# Patient Record
Sex: MALE | Race: WHITE | NOT HISPANIC OR LATINO | Employment: OTHER | URBAN - METROPOLITAN AREA
[De-identification: names, ages, dates, MRNs, and addresses within clinical notes are randomized per-mention and may not be internally consistent; named-entity substitution may affect disease eponyms.]

---

## 2019-04-19 ENCOUNTER — TRANSFERRED RECORDS (OUTPATIENT)
Dept: HEALTH INFORMATION MANAGEMENT | Facility: CLINIC | Age: 79
End: 2019-04-19

## 2019-11-20 ENCOUNTER — TRANSFERRED RECORDS (OUTPATIENT)
Dept: HEALTH INFORMATION MANAGEMENT | Facility: CLINIC | Age: 79
End: 2019-11-20

## 2021-05-20 ENCOUNTER — TRANSFERRED RECORDS (OUTPATIENT)
Dept: HEALTH INFORMATION MANAGEMENT | Facility: CLINIC | Age: 81
End: 2021-05-20

## 2021-06-16 ENCOUNTER — TRANSFERRED RECORDS (OUTPATIENT)
Dept: HEALTH INFORMATION MANAGEMENT | Facility: CLINIC | Age: 81
End: 2021-06-16

## 2021-06-16 LAB — EJECTION FRACTION: NORMAL %

## 2021-07-30 ENCOUNTER — WALK IN (OUTPATIENT)
Dept: URGENT CARE | Age: 81
End: 2021-07-30

## 2021-07-30 VITALS
RESPIRATION RATE: 18 BRPM | SYSTOLIC BLOOD PRESSURE: 118 MMHG | OXYGEN SATURATION: 96 % | DIASTOLIC BLOOD PRESSURE: 60 MMHG | HEART RATE: 75 BPM | BODY MASS INDEX: 31.94 KG/M2 | WEIGHT: 216.3 LBS | TEMPERATURE: 97.5 F

## 2021-07-30 DIAGNOSIS — L03.032 PARONYCHIA OF GREAT TOE OF LEFT FOOT: Primary | ICD-10-CM

## 2021-07-30 PROCEDURE — 99203 OFFICE O/P NEW LOW 30 MIN: CPT | Performed by: FAMILY MEDICINE

## 2021-07-30 RX ORDER — CEPHALEXIN 500 MG/1
500 CAPSULE ORAL 3 TIMES DAILY
Qty: 30 CAPSULE | Refills: 0 | Status: SHIPPED | OUTPATIENT
Start: 2021-07-30 | End: 2021-08-09

## 2021-10-01 LAB
CREATININE (EXTERNAL): 0.96 MG/DL (ref 0.6–1.5)
GFR ESTIMATED (EXTERNAL): 73 ML/MIN/1.73M2
GFR ESTIMATED (IF AFRICAN AMERICAN) (EXTERNAL): 85 ML/MIN/1.73M2
GLUCOSE (EXTERNAL): 90 MG/DL (ref 70–100)
INR (EXTERNAL): 1 (ref 0–5)
POTASSIUM (EXTERNAL): 4.5 MEQ/L (ref 3.5–5)

## 2021-12-07 ENCOUNTER — TRANSFERRED RECORDS (OUTPATIENT)
Dept: HEALTH INFORMATION MANAGEMENT | Facility: CLINIC | Age: 81
End: 2021-12-07

## 2021-12-07 LAB — ALT SERPL-CCNC: 15 IU/L (ref 0–44)

## 2021-12-20 ENCOUNTER — TRANSFERRED RECORDS (OUTPATIENT)
Dept: HEALTH INFORMATION MANAGEMENT | Facility: CLINIC | Age: 81
End: 2021-12-20

## 2022-05-27 LAB
CHOLESTEROL (EXTERNAL): 177 MG/DL (ref 100–199)
HDLC SERPL-MCNC: 35 MG/DL
LDL CHOLESTEROL CALCULATED (EXTERNAL): 106 MG/DL (ref 0–99)
TRIGLYCERIDES (EXTERNAL): 205 MG/DL (ref 0–149)

## 2022-11-10 ENCOUNTER — TRANSFERRED RECORDS (OUTPATIENT)
Dept: HEALTH INFORMATION MANAGEMENT | Facility: CLINIC | Age: 82
End: 2022-11-10

## 2022-11-10 LAB
ALT SERPL-CCNC: 13 IU/L (ref 0–44)
AST SERPL-CCNC: 14 IU/L (ref 0–40)
CHOLESTEROL (EXTERNAL): 158 MG/DL (ref 100–199)
HDLC SERPL-MCNC: 33 MG/DL
LDL CHOLESTEROL CALCULATED (EXTERNAL): 92 MG/DL (ref 0–99)
TRIGLYCERIDES (EXTERNAL): 194 MG/DL (ref 0–149)

## 2023-01-30 ENCOUNTER — TELEPHONE (OUTPATIENT)
Dept: FAMILY MEDICINE | Facility: CLINIC | Age: 83
End: 2023-01-30

## 2023-01-30 ENCOUNTER — OFFICE VISIT (OUTPATIENT)
Dept: FAMILY MEDICINE | Facility: CLINIC | Age: 83
End: 2023-01-30
Payer: COMMERCIAL

## 2023-01-30 VITALS
WEIGHT: 212 LBS | SYSTOLIC BLOOD PRESSURE: 116 MMHG | RESPIRATION RATE: 14 BRPM | HEIGHT: 69 IN | HEART RATE: 68 BPM | DIASTOLIC BLOOD PRESSURE: 68 MMHG | OXYGEN SATURATION: 96 % | BODY MASS INDEX: 31.4 KG/M2 | TEMPERATURE: 96.4 F

## 2023-01-30 DIAGNOSIS — E78.6 HYPERLIPIDEMIA WITH LOW HDL: ICD-10-CM

## 2023-01-30 DIAGNOSIS — I10 BENIGN ESSENTIAL HYPERTENSION: ICD-10-CM

## 2023-01-30 DIAGNOSIS — C61 PROSTATE CANCER (H): ICD-10-CM

## 2023-01-30 DIAGNOSIS — H40.003 GLAUCOMA SUSPECT, BILATERAL: ICD-10-CM

## 2023-01-30 DIAGNOSIS — I25.10 CORONARY ARTERY DISEASE INVOLVING NATIVE CORONARY ARTERY OF NATIVE HEART WITHOUT ANGINA PECTORIS: ICD-10-CM

## 2023-01-30 DIAGNOSIS — E78.5 HYPERLIPIDEMIA WITH LOW HDL: ICD-10-CM

## 2023-01-30 DIAGNOSIS — Z85.828 HISTORY OF BASAL CELL CARCINOMA: ICD-10-CM

## 2023-01-30 DIAGNOSIS — Z76.89 ENCOUNTER TO ESTABLISH CARE: Primary | ICD-10-CM

## 2023-01-30 PROCEDURE — 99205 OFFICE O/P NEW HI 60 MIN: CPT | Performed by: NURSE PRACTITIONER

## 2023-01-30 RX ORDER — LISINOPRIL 10 MG/1
10 TABLET ORAL DAILY
Qty: 90 TABLET | Refills: 3 | Status: SHIPPED | OUTPATIENT
Start: 2023-01-30

## 2023-01-30 RX ORDER — ATORVASTATIN CALCIUM 80 MG/1
80 TABLET, FILM COATED ORAL DAILY
Qty: 90 TABLET | Refills: 3 | Status: SHIPPED | OUTPATIENT
Start: 2023-01-30

## 2023-01-30 RX ORDER — CLOPIDOGREL BISULFATE 75 MG/1
1 TABLET ORAL DAILY
COMMUNITY
Start: 2022-05-03 | End: 2023-01-30

## 2023-01-30 RX ORDER — CARVEDILOL 6.25 MG/1
6.25 TABLET ORAL 2 TIMES DAILY WITH MEALS
COMMUNITY
End: 2023-01-30

## 2023-01-30 RX ORDER — CARVEDILOL 6.25 MG/1
6.25 TABLET ORAL 2 TIMES DAILY WITH MEALS
Qty: 180 TABLET | Refills: 3 | Status: SHIPPED | OUTPATIENT
Start: 2023-01-30

## 2023-01-30 RX ORDER — ATORVASTATIN CALCIUM 40 MG/1
80 TABLET, FILM COATED ORAL DAILY
COMMUNITY
End: 2023-01-30

## 2023-01-30 RX ORDER — ASPIRIN 81 MG/1
81 TABLET ORAL DAILY
COMMUNITY

## 2023-01-30 RX ORDER — LISINOPRIL 10 MG/1
10 TABLET ORAL DAILY
COMMUNITY
End: 2023-01-30

## 2023-01-30 RX ORDER — CLOPIDOGREL BISULFATE 75 MG/1
75 TABLET ORAL DAILY
Qty: 90 TABLET | Refills: 3 | Status: SHIPPED | OUTPATIENT
Start: 2023-01-30

## 2023-01-30 ASSESSMENT — PAIN SCALES - GENERAL: PAINLEVEL: NO PAIN (0)

## 2023-01-30 NOTE — PROGRESS NOTES
Assessment & Plan     Encounter to establish care  Reviewed medical/social/family history and health maintenance.  Due for MARY in October.  Will continue to reconcile records and HM.  He is UTD on most vaccines, instructed he can send in dates via [employer] and we will update accordingly.     Benign essential hypertension  Well controlled on current regimen.  Refills provided.   - lisinopril (ZESTRIL) 10 MG tablet; Take 1 tablet (10 mg) by mouth daily  - carvedilol (COREG) 6.25 MG tablet; Take 1 tablet (6.25 mg) by mouth 2 times daily (with meals)    Coronary artery disease involving native coronary artery of native heart without angina pectoris  Stents placed 2 years ago.  On dual anti-platelet and statin.  Will refer to cardiology to establish care.  - atorvastatin (LIPITOR) 80 MG tablet; Take 1 tablet (80 mg) by mouth daily  - carvedilol (COREG) 6.25 MG tablet; Take 1 tablet (6.25 mg) by mouth 2 times daily (with meals)  - clopidogrel (PLAVIX) 75 MG tablet; Take 1 tablet (75 mg) by mouth daily  - Adult Cardiology Eval  Referral; Future    Hyperlipidemia with low HDL  Was recommended he go on injectable cholesterol agent for low HDL by previous cardiologist, not covered by insurance.  At his age, not sure if he would benefit.  We discussed maintaining activity levels and following mediterranean diet.  Will defer to cardiology if additional medications are needed to improve his panel.     Glaucoma suspect, bilateral  History of elevated eye pressures, stable overall and not on drops currently.    - Adult Eye  Referral; Future    Prostate cancer (H)  History of, completed radiation therapy.  Will refer to urology for ongoing PSA monitoring which can certainly also be coordinated in our lab.    - Adult Urology  Referral; Future    History of basal cell carcinoma  - Adult Dermatology Referral; Future    Review of external notes as documented elsewhere in note  Ordering of each unique  "test  Prescription drug management  I spent a total of 60 minutes on the day of the visit.   Time spent doing chart review, history and exam, documentation and further activities per the note       BMI:   Estimated body mass index is 31.54 kg/m  as calculated from the following:    Height as of this encounter: 1.746 m (5' 8.75\").    Weight as of this encounter: 96.2 kg (212 lb).           No follow-ups on file.    DAVID Dowell [facility]    Janice Paris is a 82 year old, presenting for the following health issues:  Establish Care      History of Present Illness       Reason for visit:  Estblishing Care    He eats 0-1 servings of fruits and vegetables daily.He consumes 0 sweetened beverage(s) daily.He exercises with enough effort to increase his heart rate 30 to 60 minutes per day.  He exercises with enough effort to increase his heart rate 3 or less days per week.   He is taking medications regularly.     Originally from [place], moved to [place] for 5 years, then in [place] in 13years.  Wife passed away in Dec 2021.  Kids and grandkids were in [place], but are all moving back to [place].    Now living in [facility] across the [address], moved in just a week ago to the [facility].     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  Plays pickleball.  electric bike.    Followed with a cardiologist, Dermatologist, ophthalmologist, urologist.     PMH:  Had stents x 2 placed 2 years ago.    Dermatology for routine skin checks, basal cell carcinoma.    Urology:  History of prostate cancer 3 years ago.  Did radiation treatment, narrow beam radiation.   Has been testing PSA every 6-8 months.  PSA never hit zero, but has been dropping.    Opthalmology: 25 years ago had increased eye pressures.  Was told he had glaucoma.  Was on eye drops for a long time, when he moved to [place]- was told he could " "stop.  Hasn't been on drops for a long time, but eye pressure is still mildly elevated.  Also had cataract surgery about 5 years ago.      Last HDL (12/8/2022) was 35, lDL was 31, Total cholesteral 173, Triglycerides 213.  Was told by his cardiologist he should be on an injectable cholesterol medication, was not covered by his insurance.      Review of Systems   Constitutional, HEENT, cardiovascular, pulmonary, gi and gu systems are negative, except as otherwise noted.      Objective    /68   Pulse 68   Temp (!) 96.4  F (35.8  C) (Tympanic)   Resp 14   Ht 1.746 m (5' 8.75\")   Wt 96.2 kg (212 lb)   SpO2 96%   BMI 31.54 kg/m    Body mass index is 31.54 kg/m .  Physical Exam   GENERAL: healthy, alert and no distress  EYES: Eyes grossly normal to inspection, PERRL and conjunctivae and sclerae normal  MS: no gross musculoskeletal defects noted, no edema  NEURO: Normal strength and tone, mentation intact and speech normal  PSYCH: mentation appears normal, affect normal/bright                    "

## 2023-02-09 ENCOUNTER — OFFICE VISIT (OUTPATIENT)
Dept: CARDIOLOGY | Facility: CLINIC | Age: 83
End: 2023-02-09
Attending: NURSE PRACTITIONER
Payer: COMMERCIAL

## 2023-02-09 VITALS
OXYGEN SATURATION: 94 % | BODY MASS INDEX: 31.39 KG/M2 | SYSTOLIC BLOOD PRESSURE: 105 MMHG | DIASTOLIC BLOOD PRESSURE: 67 MMHG | HEART RATE: 63 BPM | RESPIRATION RATE: 18 BRPM | WEIGHT: 211 LBS

## 2023-02-09 DIAGNOSIS — I25.10 CORONARY ARTERY DISEASE INVOLVING NATIVE CORONARY ARTERY OF NATIVE HEART WITHOUT ANGINA PECTORIS: ICD-10-CM

## 2023-02-09 LAB
ANION GAP SERPL CALCULATED.3IONS-SCNC: 10 MMOL/L (ref 7–15)
ATRIAL RATE - MUSE: 55 BPM
BASOPHILS # BLD AUTO: 0.1 10E3/UL (ref 0–0.2)
BASOPHILS NFR BLD AUTO: 1 %
BUN SERPL-MCNC: 24.6 MG/DL (ref 8–23)
CALCIUM SERPL-MCNC: 9.6 MG/DL (ref 8.8–10.2)
CHLORIDE SERPL-SCNC: 106 MMOL/L (ref 98–107)
CHOLEST SERPL-MCNC: 159 MG/DL
CREAT SERPL-MCNC: 0.95 MG/DL (ref 0.67–1.17)
DEPRECATED HCO3 PLAS-SCNC: 25 MMOL/L (ref 22–29)
DIASTOLIC BLOOD PRESSURE - MUSE: NORMAL MMHG
EOSINOPHIL # BLD AUTO: 0.2 10E3/UL (ref 0–0.7)
EOSINOPHIL NFR BLD AUTO: 2 %
ERYTHROCYTE [DISTWIDTH] IN BLOOD BY AUTOMATED COUNT: 13.7 % (ref 10–15)
GFR SERPL CREATININE-BSD FRML MDRD: 79 ML/MIN/1.73M2
GLUCOSE SERPL-MCNC: 91 MG/DL (ref 70–99)
HCT VFR BLD AUTO: 42.8 % (ref 40–53)
HDLC SERPL-MCNC: 37 MG/DL
HGB BLD-MCNC: 14.4 G/DL (ref 13.3–17.7)
IMM GRANULOCYTES # BLD: 0 10E3/UL
IMM GRANULOCYTES NFR BLD: 1 %
INTERPRETATION ECG - MUSE: NORMAL
LDLC SERPL CALC-MCNC: 88 MG/DL
LYMPHOCYTES # BLD AUTO: 1.8 10E3/UL (ref 0.8–5.3)
LYMPHOCYTES NFR BLD AUTO: 21 %
MCH RBC QN AUTO: 31.2 PG (ref 26.5–33)
MCHC RBC AUTO-ENTMCNC: 33.6 G/DL (ref 31.5–36.5)
MCV RBC AUTO: 93 FL (ref 78–100)
MONOCYTES # BLD AUTO: 0.9 10E3/UL (ref 0–1.3)
MONOCYTES NFR BLD AUTO: 11 %
NEUTROPHILS # BLD AUTO: 5.6 10E3/UL (ref 1.6–8.3)
NEUTROPHILS NFR BLD AUTO: 64 %
NONHDLC SERPL-MCNC: 122 MG/DL
NRBC # BLD AUTO: 0 10E3/UL
NRBC BLD AUTO-RTO: 0 /100
P AXIS - MUSE: 47 DEGREES
PLATELET # BLD AUTO: 206 10E3/UL (ref 150–450)
POTASSIUM SERPL-SCNC: 4.4 MMOL/L (ref 3.4–5.3)
PR INTERVAL - MUSE: 162 MS
QRS DURATION - MUSE: 86 MS
QT - MUSE: 428 MS
QTC - MUSE: 409 MS
R AXIS - MUSE: -24 DEGREES
RBC # BLD AUTO: 4.62 10E6/UL (ref 4.4–5.9)
SODIUM SERPL-SCNC: 141 MMOL/L (ref 136–145)
SYSTOLIC BLOOD PRESSURE - MUSE: NORMAL MMHG
T AXIS - MUSE: 39 DEGREES
TRIGL SERPL-MCNC: 169 MG/DL
VENTRICULAR RATE- MUSE: 55 BPM
WBC # BLD AUTO: 8.5 10E3/UL (ref 4–11)

## 2023-02-09 PROCEDURE — 36415 COLL VENOUS BLD VENIPUNCTURE: CPT | Performed by: INTERNAL MEDICINE

## 2023-02-09 PROCEDURE — 99205 OFFICE O/P NEW HI 60 MIN: CPT | Mod: 25 | Performed by: INTERNAL MEDICINE

## 2023-02-09 PROCEDURE — 80061 LIPID PANEL: CPT | Performed by: INTERNAL MEDICINE

## 2023-02-09 PROCEDURE — 85025 COMPLETE CBC W/AUTO DIFF WBC: CPT | Performed by: INTERNAL MEDICINE

## 2023-02-09 PROCEDURE — 80048 BASIC METABOLIC PNL TOTAL CA: CPT | Performed by: INTERNAL MEDICINE

## 2023-02-09 PROCEDURE — 93000 ELECTROCARDIOGRAM COMPLETE: CPT | Performed by: INTERNAL MEDICINE

## 2023-02-09 NOTE — PROGRESS NOTES
HEART CARE ENCOUNTER CONSULTATON NOTE      [facility]  [phone]      Assessment/Recommendations   Assessment/Plan:  CAD - will get records from [place]. On aspirin and clopidogrel, which I suspect we can stop the clopidogrel. On high intensity statin. Diet and exercise discussed. EKG today sinus ester with a PAC.    Hyperlipidemia -  a year ago. Repeat today and will likely add zetia.    Hypertension - controlled, decreasing lisinopril as below. Check BP daily at noon for two weeks and let us know the numbers.     Orthostasis - decrease lisinopril and hydration encouraged.     F/U 12 months       History of Present Illness/Subjective    HPI: Shilo Carver is a 82 year old male with CAD, hypertension and hyperlipidemia who has moved from [place] since his wife's recent death. No [place] records are currently availble. He estimates that in 2020 he saw his PCP for hypertension. He was then sent to a cardiologist (Dr. Neo Pinzon in [place], [place]) and had what sounds like a coronary CTA. He then underwent stenting with three Xience DENISHA (3.0 x 23, 2.5 x 12, and 3.5 x38) to two different arteries he thinks. Wellington says he had absolutely no symptoms at that time and did not feel any differently after. Wellington is active, typically biking and playing pickle ball without trouble. He has not started exercising again since moving her this winter. Wellington denies chest pain, pnd/orthopnea, dyspnea, edema, syncope and palpitations. He has some brief orthostasis. His BP was high during his move and his lisinopril was increased to 5mg daily. Over the past month or so he has noted the orthostasis and that his BP has been on the lower side. Wellington is having a trouble with food as he never had to cook before and is eating frozen foods.          Physical Examination  Review of Systems   Vitals: /67 (BP Location: Right arm, Patient Position: Sitting, Cuff Size: Adult Large)   Pulse 63   Resp 18   Wt 95.7 kg  (211 lb)   SpO2 94%   BMI 31.39 kg/m    BMI= Body mass index is 31.39 kg/m .  Wt Readings from Last 3 Encounters:   23 95.7 kg (211 lb)   23 96.2 kg (212 lb)       General Appearance:   no distress, overweight body habitus   ENT/Mouth: membranes moist, no oral lesions or bleeding gums.      EYES:  no scleral icterus, normal conjunctivae   Neck: no carotid bruits or thyromegaly   Chest/Lungs:   lungs are clear to auscultation, no rales or wheezing, no sternal scar, equal chest wall expansion    Cardiovascular:   Regular. Normal first and second heart sounds with no murmur. No rubs or gallops; the right carotid, radial and posterior tibial pulses are intact and the left carotid, radial and posterior tibial pulses are intact.  Jugular venous pressure is flat, no edema bilaterally    Abdomen:  no organomegaly, masses, bruits, or tenderness; bowel sounds are present   Extremities: no cyanosis or clubbing   Skin: no xanthelasma, warm.    Neurologic: normal  bilateral, no tremors     Psychiatric: alert and oriented x3, calm        Please refer above for cardiac ROS details.        Medical History  Surgical History Family History Social History   Past Medical History:   Diagnosis Date     Cancer (H)      Coronary artery disease      Hyperlipidemia      Hypertension      Past Surgical History:   Procedure Laterality Date     CORONARY ANGIOGRAPHY ADULT ORDER       HEART CATH, ANGIOPLASTY       Family History   Family history unknown: Yes        Social History     Socioeconomic History     Marital status:      Spouse name: Not on file     Number of children: Not on file     Years of education: Not on file     Highest education level: Not on file   Occupational History     Not on file   Tobacco Use     Smoking status: Former     Types: Cigarettes     Start date:      Quit date:      Years since quittin.1     Smokeless tobacco: Never   Substance and Sexual Activity     Alcohol use: Yes      Comment: 2/night     Drug use: Never     Sexual activity: Not on file   Other Topics Concern     Not on file   Social History Narrative     Not on file     Social Determinants of Health     Financial Resource Strain: Not on file   Food Insecurity: Not on file   Transportation Needs: Not on file   Physical Activity: Not on file   Stress: Not on file   Social Connections: Not on file   Intimate Partner Violence: Not on file   Housing Stability: Not on file           Medications  Allergies   Current Outpatient Medications   Medication Sig Dispense Refill     aspirin 81 MG EC tablet Take 81 mg by mouth daily       atorvastatin (LIPITOR) 80 MG tablet Take 1 tablet (80 mg) by mouth daily 90 tablet 3     carvedilol (COREG) 6.25 MG tablet Take 1 tablet (6.25 mg) by mouth 2 times daily (with meals) 180 tablet 3     clopidogrel (PLAVIX) 75 MG tablet Take 1 tablet (75 mg) by mouth daily 90 tablet 3     lisinopril (ZESTRIL) 10 MG tablet Take 1 tablet (10 mg) by mouth daily 90 tablet 3     No Known Allergies       Lab Results    Chemistry/lipid CBC Cardiac Enzymes/BNP/TSH/INR   No results for input(s): CHOL, HDL, LDL, TRIG, CHOLHDLRATIO in the last 30857 hours.  No results for input(s): LDL in the last 87439 hours.  No results for input(s): NA, POTASSIUM, CHLORIDE, CO2, GLC, BUN, CR, GFRESTIMATED, JUAN in the last 32201 hours.    Invalid input(s): GRFESTBLACK  No results for input(s): CR in the last 64062 hours.  No results for input(s): A1C in the last 65806 hours.       No results for input(s): WBC, HGB, HCT, MCV, PLT in the last 52695 hours.  No results for input(s): HGB in the last 31361 hours. No results for input(s): TROPONINI in the last 24044 hours.  No results for input(s): BNP, NTBNPI, NTBNP in the last 38353 hours.  No results for input(s): TSH in the last 33552 hours.  No results for input(s): INR in the last 17863 hours.     Today's clinic visit entailed:  Review of prior external note(s) from - [facility]where  information from epic reviewed  60 minutes spent on the date of the encounter doing chart review, review of outside records, review of test results, interpretation of tests, patient visit and documentation   Provider  Link to [facility] Help Grid     The level of medical decision making during this visit was of high complexity.        Lindsey Kumar MD

## 2023-02-09 NOTE — LETTER
2/9/2023    Shauna Feliz, APRN CNP  [address]  [place] [address]    RE: Shilo Carver       Dear Colleague,     I had the [facility] Shilo [facility].    HEART CARE ENCOUNTER CONSULTATON NOTE      [facility]  [phone]      Assessment/Recommendations   Assessment/Plan:  CAD - will get records from [place]. On aspirin and clopidogrel, which I suspect we can stop the clopidogrel. On high intensity statin. Diet and exercise discussed. EKG today sinus ester with a PAC.    Hyperlipidemia -  a year ago. Repeat today and will likely add zetia.    Hypertension - controlled, decreasing lisinopril as below. Check BP daily at noon for two weeks and let us know the numbers.     Orthostasis - decrease lisinopril and hydration encouraged.     F/U 12 months       History of Present Illness/Subjective    HPI: Shilo Carver is a 82 year old male with CAD, hypertension and hyperlipidemia who has moved from [place] since his wife's recent death. No [place] records are currently availble. He estimates that in 2020 he saw his PCP for hypertension. He was then sent to a cardiologist (Dr. Neo Pinzon in [place], [place]) and had what sounds like a coronary CTA. He then underwent stenting with three Xience DENISHA (3.0 x 23, 2.5 x 12, and 3.5 x38) to two different arteries he thinks. Wellington says he had absolutely no symptoms at that time and did not feel any differently after. Wellington is active, typically biking and playing pickle ball without trouble. He has not started exercising again since moving her this winter. Wellington denies chest pain, pnd/orthopnea, dyspnea, edema, syncope and palpitations. He has some brief orthostasis. His BP was high during his move and his lisinopril was increased to 5mg daily. Over the past month or so he has noted the orthostasis and that his BP has been on the lower side. Wellington is having a trouble with food as he never had to cook before and is  eating frozen foods.          Physical Examination  Review of Systems   Vitals: /67 (BP Location: Right arm, Patient Position: Sitting, Cuff Size: Adult Large)   Pulse 63   Resp 18   Wt 95.7 kg (211 lb)   SpO2 94%   BMI 31.39 kg/m    BMI= Body mass index is 31.39 kg/m .  Wt Readings from Last 3 Encounters:   02/09/23 95.7 kg (211 lb)   01/30/23 96.2 kg (212 lb)       General Appearance:   no distress, overweight body habitus   ENT/Mouth: membranes moist, no oral lesions or bleeding gums.      EYES:  no scleral icterus, normal conjunctivae   Neck: no carotid bruits or thyromegaly   Chest/Lungs:   lungs are clear to auscultation, no rales or wheezing, no sternal scar, equal chest wall expansion    Cardiovascular:   Regular. Normal first and second heart sounds with no murmur. No rubs or gallops; the right carotid, radial and posterior tibial pulses are intact and the left carotid, radial and posterior tibial pulses are intact.  Jugular venous pressure is flat, no edema bilaterally    Abdomen:  no organomegaly, masses, bruits, or tenderness; bowel sounds are present   Extremities: no cyanosis or clubbing   Skin: no xanthelasma, warm.    Neurologic: normal  bilateral, no tremors     Psychiatric: alert and oriented x3, calm        Please refer above for cardiac ROS details.        Medical History  Surgical History Family History Social History   Past Medical History:   Diagnosis Date     Cancer (H)      Coronary artery disease      Hyperlipidemia      Hypertension      Past Surgical History:   Procedure Laterality Date     CORONARY ANGIOGRAPHY ADULT ORDER       HEART CATH, ANGIOPLASTY       Family History   Family history unknown: Yes        Social History     Socioeconomic History     Marital status:      Spouse name: Not on file     Number of children: Not on file     Years of education: Not on file     Highest education level: Not on file   Occupational History     Not on file   Tobacco Use      Smoking status: Former     Types: Cigarettes     Start date:      Quit date: 1980     Years since quittin.1     Smokeless tobacco: Never   Substance and Sexual Activity     Alcohol use: Yes     Comment: 2/night     Drug use: Never     Sexual activity: Not on file   Other Topics Concern     Not on file   Social History Narrative     Not on file     Social Determinants of Health     Financial Resource Strain: Not on file   Food Insecurity: Not on file   Transportation Needs: Not on file   Physical Activity: Not on file   Stress: Not on file   Social Connections: Not on file   Intimate Partner Violence: Not on file   Housing Stability: Not on file           Medications  Allergies   Current Outpatient Medications   Medication Sig Dispense Refill     aspirin 81 MG EC tablet Take 81 mg by mouth daily       atorvastatin (LIPITOR) 80 MG tablet Take 1 tablet (80 mg) by mouth daily 90 tablet 3     carvedilol (COREG) 6.25 MG tablet Take 1 tablet (6.25 mg) by mouth 2 times daily (with meals) 180 tablet 3     clopidogrel (PLAVIX) 75 MG tablet Take 1 tablet (75 mg) by mouth daily 90 tablet 3     lisinopril (ZESTRIL) 10 MG tablet Take 1 tablet (10 mg) by mouth daily 90 tablet 3     No Known Allergies       Lab Results    Chemistry/lipid CBC Cardiac Enzymes/BNP/TSH/INR   No results for input(s): CHOL, HDL, LDL, TRIG, CHOLHDLRATIO in the last 70114 hours.  No results for input(s): LDL in the last 25336 hours.  No results for input(s): NA, POTASSIUM, CHLORIDE, CO2, GLC, BUN, CR, GFRESTIMATED, JUAN in the last 61135 hours.    Invalid input(s): GRFESTBLACK  No results for input(s): CR in the last 28920 hours.  No results for input(s): A1C in the last 24924 hours.       No results for input(s): WBC, HGB, HCT, MCV, PLT in the last 21718 hours.  No results for input(s): HGB in the last 13109 hours. No results for input(s): TROPONINI in the last 41625 hours.  No results for input(s): BNP, NTBNPI, NTBNP in the last 18313 hours.  No  results for input(s): TSH in the last 37748 hours.  No results for input(s): INR in the last 73342 hours.     Today's clinic visit entailed:  Review of prior external note(s) from - [facility] information from epic reviewed  60 minutes spent on the date of the encounter doing chart review, review of outside records, review of test results, interpretation of tests, patient visit and documentation   Provider  Link to [facility] Help Grid     The level of medical decision making during this visit was of high complexity.        Lindsey Kumar MD              Thank you for allowing me to participate in the care of your patient.      Sincerely,     Lindsey Kumar MD     [facility]  cc:   Shauna Feliz, APRN CNP  [address] [place] [address]

## 2023-02-12 ENCOUNTER — HEALTH MAINTENANCE LETTER (OUTPATIENT)
Age: 83
End: 2023-02-12

## 2023-02-15 ENCOUNTER — TELEPHONE (OUTPATIENT)
Dept: CARDIOLOGY | Facility: CLINIC | Age: 83
End: 2023-02-15
Payer: COMMERCIAL

## 2023-02-15 DIAGNOSIS — I25.10 CORONARY ARTERY DISEASE INVOLVING NATIVE CORONARY ARTERY OF NATIVE HEART WITHOUT ANGINA PECTORIS: Primary | ICD-10-CM

## 2023-02-15 RX ORDER — EZETIMIBE 10 MG/1
10 TABLET ORAL DAILY
Qty: 30 TABLET | Refills: 11 | Status: SHIPPED | OUTPATIENT
Start: 2023-02-15 | End: 2023-02-15

## 2023-02-15 RX ORDER — EZETIMIBE 10 MG/1
10 TABLET ORAL DAILY
Qty: 90 TABLET | Refills: 3 | Status: SHIPPED | OUTPATIENT
Start: 2023-02-15

## 2023-02-15 NOTE — TELEPHONE ENCOUNTER
PC with patient and review of recent blood work.   Andre Cannon RN   2/15/2023  4:19 PM CST Back to Top      PC with patient and review. Pt verbalized understanding. Agreeable to start zetia. Rx sent to verified pharmacy location. Lab order placed. Encouraged patient to obtain in 3 months. See telephone encounter and discussion of BP readings. Naomi VELA    He will obtain his fasting lipid panel at his PCP office. No further questions. MIRIANRn

## 2024-03-10 ENCOUNTER — HEALTH MAINTENANCE LETTER (OUTPATIENT)
Age: 84
End: 2024-03-10

## 2024-05-07 DIAGNOSIS — I25.10 CORONARY ARTERY DISEASE INVOLVING NATIVE CORONARY ARTERY OF NATIVE HEART WITHOUT ANGINA PECTORIS: Primary | ICD-10-CM

## 2025-03-16 ENCOUNTER — HEALTH MAINTENANCE LETTER (OUTPATIENT)
Age: 85
End: 2025-03-16